# Patient Record
Sex: FEMALE | Race: BLACK OR AFRICAN AMERICAN | NOT HISPANIC OR LATINO | URBAN - METROPOLITAN AREA
[De-identification: names, ages, dates, MRNs, and addresses within clinical notes are randomized per-mention and may not be internally consistent; named-entity substitution may affect disease eponyms.]

---

## 2024-05-01 ENCOUNTER — TELEPHONE (OUTPATIENT)
Dept: OBSTETRICS AND GYNECOLOGY | Facility: CLINIC | Age: 59
End: 2024-05-01

## 2024-05-01 NOTE — TELEPHONE ENCOUNTER
Spoke with pt. Pt wanted to schedule annual with . Explained that she is only seeing fibroid pts. Gave pt the names of all the doctors in the clinic. Pt said she will call back once she decides

## 2024-05-01 NOTE — TELEPHONE ENCOUNTER
----- Message from Zayra Leonard sent at 5/1/2024  2:40 PM CDT -----  Regarding: pt called  Name of Who is Calling: KEDAR AGUILERA [[id number]]      What is the request in detail: requesting to establish care and make a wellness visit. Please advise       Can the clinic reply by [facility]: No       [facility]: [phone]